# Patient Record
Sex: MALE | Race: BLACK OR AFRICAN AMERICAN
[De-identification: names, ages, dates, MRNs, and addresses within clinical notes are randomized per-mention and may not be internally consistent; named-entity substitution may affect disease eponyms.]

---

## 2018-07-27 ENCOUNTER — HOSPITAL ENCOUNTER (EMERGENCY)
Dept: HOSPITAL 96 - M.ERS | Age: 38
Discharge: HOME | End: 2018-07-27
Payer: COMMERCIAL

## 2018-07-27 VITALS — HEIGHT: 71 IN | WEIGHT: 189.99 LBS | BODY MASS INDEX: 26.6 KG/M2

## 2018-07-27 VITALS — DIASTOLIC BLOOD PRESSURE: 64 MMHG | SYSTOLIC BLOOD PRESSURE: 138 MMHG

## 2018-07-27 DIAGNOSIS — Z91.030: ICD-10-CM

## 2018-07-27 DIAGNOSIS — I10: ICD-10-CM

## 2018-07-27 DIAGNOSIS — I80.8: Primary | ICD-10-CM

## 2018-07-27 NOTE — EKG
[facility]
[address] [place] [address]
Phone:  [phone]                     ELECTROCARDIOGRAM REPORT      
_______________________________________________________________________________
 
Name:       JENNIFER TA                 [id number]      Account #:      [id number]  
Admission:  18     Attend Phys:                         
Discharge:  18     Date of Birth:  [birth year]  
         Report #: [id number]
    [id number]
_______________________________________________________________________________
THIS REPORT FOR:  //name//                      
 
                         [facility] ED
                                       
Test Date:    2018               Test Time:    13:17:57
Pat Name:     JENNIFER KEYON             Department:   
Patient ID:   [id number]            Room:          
Gender:       M                        Technician:   
:          1980               Requested By: Susan Rodriguez
Order Number: [id number] MD:   Ernst Grider
                                 Measurements
Intervals                              Axis          
Rate:         53                       P:            43
AL:           157                      QRS:          60
QRSD:         88                       T:            48
QT:           401                                    
QTc:          377                                    
                           Interpretive Statements
Sinus rhythm
ST elev, probable normal early repol pattern
Baseline wander in lead(s) I,II,aVR,V2
No previous ECG available for comparison
 
Electronically Signed On 2018 16:34:32 CDT by Ernst Grider
[email or web address]
 
 
 
 
 
 
 
 
 
 
 
 
 
 
 
 
 
 
  <ELECTRONICALLY SIGNED>
                                           By: Ernst Grider MD, [facility]   
  18     1634
D: 18   _____________________________________
T: 18   Ernst Grider MD, [facility]     /EPI